# Patient Record
Sex: FEMALE | Race: WHITE | Employment: FULL TIME | URBAN - METROPOLITAN AREA
[De-identification: names, ages, dates, MRNs, and addresses within clinical notes are randomized per-mention and may not be internally consistent; named-entity substitution may affect disease eponyms.]

---

## 2022-11-10 ENCOUNTER — OFFICE VISIT (OUTPATIENT)
Dept: ORTHOPEDIC SURGERY | Age: 42
End: 2022-11-10

## 2022-11-10 DIAGNOSIS — S43.431A LABRAL TEAR OF SHOULDER, RIGHT, INITIAL ENCOUNTER: Primary | ICD-10-CM

## 2022-11-10 DIAGNOSIS — M25.511 RIGHT SHOULDER PAIN, UNSPECIFIED CHRONICITY: ICD-10-CM

## 2022-11-10 DIAGNOSIS — M75.21 BICEPS TENDINITIS OF RIGHT SHOULDER: ICD-10-CM

## 2022-11-10 PROCEDURE — 99204 OFFICE O/P NEW MOD 45 MIN: CPT | Performed by: PHYSICIAN ASSISTANT

## 2022-11-10 RX ORDER — TRAMADOL HYDROCHLORIDE 50 MG/1
TABLET ORAL
COMMUNITY
Start: 2022-10-20

## 2022-11-10 RX ORDER — CYCLOBENZAPRINE HCL 10 MG
TABLET ORAL
COMMUNITY
Start: 2022-10-27

## 2022-11-10 RX ORDER — NABUMETONE 500 MG/1
TABLET, FILM COATED ORAL
COMMUNITY
Start: 2022-10-27

## 2022-11-10 RX ORDER — HYDROCORTISONE ACETATE, PRAMOXINE HCL 2.5; 1 G/100G; G/100G
CREAM TOPICAL 3 TIMES DAILY PRN
COMMUNITY
Start: 2016-03-28

## 2022-11-10 RX ORDER — DIAZEPAM 5 MG/1
TABLET ORAL
COMMUNITY
Start: 2015-11-03

## 2022-11-10 RX ORDER — VALACYCLOVIR HYDROCHLORIDE 1 G/1
TABLET, FILM COATED ORAL
COMMUNITY
Start: 2022-10-07

## 2022-11-10 RX ORDER — MELOXICAM 15 MG/1
TABLET ORAL
COMMUNITY
Start: 2022-11-02

## 2022-11-10 RX ORDER — METRONIDAZOLE 500 MG/1
TABLET ORAL
COMMUNITY
Start: 2022-10-27

## 2022-11-10 RX ORDER — ERGOCALCIFEROL (VITAMIN D2) 1250 MCG
CAPSULE ORAL
COMMUNITY
Start: 2015-10-12

## 2022-11-10 NOTE — PROGRESS NOTES
Name: Chiki Wright  YOB: 1980  Gender: female  MRN: [id number]    CC:   Chief Complaint   Patient presents with    Shoulder Pain     Right shoulder pain    Right shoulder pain    HPI:  This patient presents with a 2.5-year history of Right shoulder pain. Patient is unsure the specific mechanism of injury 2.5 years ago but notes she thought she just slept on it for a while. She then notes that she does dance and her dance partner did some jerky pushing on her shoulder. She notes anterior, posterior and lateral shoulder pain. Most persistently, she states the anterior shoulder around the biceps has been the most persistent. She does note popping and catching. Notes interference with sleep, driving, lifting, and going across the body. She has tried massage, acupuncture, 3 rounds of physical therapy, dry needling and cupping without any relief. No Known Allergies  History reviewed. No pertinent past medical history. History reviewed. No pertinent surgical history. History reviewed. No pertinent family history. Social History     Socioeconomic History    Marital status: Unknown     Spouse name: Not on file    Number of children: Not on file    Years of education: Not on file    Highest education level: Not on file   Occupational History    Not on file   Tobacco Use    Smoking status: Never    Smokeless tobacco: Never   Substance and Sexual Activity    Alcohol use: Not on file    Drug use: Not on file    Sexual activity: Not on file   Other Topics Concern    Not on file   Social History Narrative    Not on file     Social Determinants of Health     Financial Resource Strain: Not on file   Food Insecurity: Not on file   Transportation Needs: Not on file   Physical Activity: Not on file   Stress: Not on file   Social Connections: Not on file   Intimate Partner Violence: Not on file   Housing Stability: Not on file        No flowsheet data found.     Review of Systems  Noncontributory Pertinent positives and negatives are addressed with the patient, particularly those related to musculoskeletal concerns. Non-orthopaedic concerns were referred back to the primary care physician. PE:    GEN: General appearance is that of a healthy patient, alert and oriented, in no distress. WDWN. HEENT:  Normocephalic, atraumatic. Extraocular muscles are intact. Neck:  Supple. Heart:  Regular pulse exam on all extremities. Good color and warmth noted. Lungs:  Normal non-labored breathing with no obvious shortness of breath. Abdomen:  WNL's. Skin:  No signs of rash or bruising. Pysch: Answers questions appropriately, AO X 3. MSK:    Cervical spine: No tenderness. Normal  active motion. Negative Spurling's maneuver. SHOULDER   Right (Involved) left   Skin Intact Intact   Radial Pulses 2+ Symmetrical 2+ Symmetrical   Deformity None Normal   Myotomes Normal Normal   Dermatomes  Normal Normal    ROM Range of motion is guarded but when pushed, no true endpoint. Approximately 10 degrees decreased forward flexion comparison to the contralateral side which is hypermobile. Full   Strength Unable to assess abduction, 5/5 internal and external rotation No weakness   Atrophy None noted None noted   Effusion/Swelling  None noted None noted   Palpation Minimally TTP at the shoulder tender palpate anteriorly. No tenderness   Bicep Tendon Rupture  Negative  Negative signs   Bear Hug, Belly Press Negative, negative  Negative   Crossed Arm Adduction Test Normal     Instability/Ant. Apprehension Test None noted None noted   Impingement Positive   sylvia Mccauley, WILMER Negative      X-rays:   Grashey, axillary and outlet views of the Right shoulder that were obtained and reviewed today in the office, show a type downsloping acromion. The humeral head is not high riding  . No evidence of fracture, arthritis, dislocation or loose body. Moderate degenerative changes of the A-C joint.      Impression: Right shoulder appears normal     Assessment:     ICD-10-CM    1. Labral tear of shoulder, right, initial encounter  S43.431A MRI SHOULDER RIGHT WO CONTRAST     XR SHOULDER ARTHROGRAM RIGHT S&I      2. Right shoulder pain, unspecified chronicity  M25.511 XR SHOULDER RIGHT (MIN 2 VIEWS)     MRI SHOULDER RIGHT WO CONTRAST     XR SHOULDER ARTHROGRAM RIGHT S&I      3. Biceps tendinitis of right shoulder  M75.21 MRI SHOULDER RIGHT WO CONTRAST     XR SHOULDER ARTHROGRAM RIGHT S&I           Plan: I had a long discussion with the patient regarding the natural history of the problem, as well as treatment options. I discussed with the patient differential diagnoses including adhesive capsulitis, biceps tendinitis, labral pathology. She has been dealing with this and treated it conservatively for the last 2.5 years. I like to obtain an MRI with an arthrogram given her symptoms and biceps tenderness to assess for SLAP tear as well as other intra-articular pathology. I am going to have the patient follow-up with Dr. Monique Castro after MRI in order to review results and possible surgical discussion. We discussed the only conservative treatment she has not tried thus far as a diagnostic and therapeutic intra-articular cortisone injection. We will see the patient back after imaging discuss definitive plans based on findings. Treatment:     Given the chronicity and severity of the patient's symptoms, we will obtain an MRI. We will see them back after the scan and make a determination regarding further treatment. If there is a tear or other problem, they may require surgery. If negative, would recommend NSAID's, PT, or injection. They are amenable to this treatment plan. Return for MRI results with Dr. Monique Castro. Thank you for the opportunity to see your patient. If you have any questions please give me a call.   Sincerely,    Danish Moreno  11/10/22

## 2022-12-13 ENCOUNTER — HOSPITAL ENCOUNTER (OUTPATIENT)
Dept: MRI IMAGING | Age: 42
Discharge: HOME | End: 2022-12-16
Payer: COMMERCIAL

## 2022-12-13 ENCOUNTER — HOSPITAL ENCOUNTER (OUTPATIENT)
Dept: GENERAL RADIOLOGY | Age: 42
Discharge: HOME | End: 2022-12-16
Payer: COMMERCIAL

## 2022-12-13 VITALS
HEART RATE: 74 BPM | OXYGEN SATURATION: 100 % | RESPIRATION RATE: 18 BRPM | TEMPERATURE: 98.1 F | DIASTOLIC BLOOD PRESSURE: 77 MMHG | WEIGHT: 125 LBS | SYSTOLIC BLOOD PRESSURE: 122 MMHG | HEIGHT: 64 IN | BODY MASS INDEX: 21.34 KG/M2

## 2022-12-13 DIAGNOSIS — M25.511 RIGHT SHOULDER PAIN, UNSPECIFIED CHRONICITY: ICD-10-CM

## 2022-12-13 DIAGNOSIS — M75.21 BICEPS TENDINITIS OF RIGHT SHOULDER: ICD-10-CM

## 2022-12-13 DIAGNOSIS — S43.431A LABRAL TEAR OF SHOULDER, RIGHT, INITIAL ENCOUNTER: ICD-10-CM

## 2022-12-13 PROCEDURE — 2500000003 HC RX 250 WO HCPCS: Performed by: PHYSICIAN ASSISTANT

## 2022-12-13 PROCEDURE — 6360000004 HC RX CONTRAST MEDICATION: Performed by: PHYSICIAN ASSISTANT

## 2022-12-13 PROCEDURE — 73222 MRI JOINT UPR EXTREM W/DYE: CPT

## 2022-12-13 PROCEDURE — A9579 GAD-BASE MR CONTRAST NOS,1ML: HCPCS | Performed by: PHYSICIAN ASSISTANT

## 2022-12-13 PROCEDURE — 73040 CONTRAST X-RAY OF SHOULDER: CPT

## 2022-12-13 PROCEDURE — 2580000003 HC RX 258: Performed by: PHYSICIAN ASSISTANT

## 2022-12-13 RX ORDER — 0.9 % SODIUM CHLORIDE 0.9 %
500 INTRAVENOUS SOLUTION INTRAVENOUS ONCE
Status: COMPLETED | OUTPATIENT
Start: 2022-12-13 | End: 2022-12-13

## 2022-12-13 RX ORDER — LIDOCAINE HYDROCHLORIDE 10 MG/ML
5 INJECTION, SOLUTION INFILTRATION; PERINEURAL ONCE
Status: COMPLETED | OUTPATIENT
Start: 2022-12-13 | End: 2022-12-13

## 2022-12-13 RX ADMIN — LIDOCAINE HYDROCHLORIDE 5 ML: 10 INJECTION, SOLUTION INFILTRATION; PERINEURAL at 15:07

## 2022-12-13 RX ADMIN — GADOTERIDOL 2.5 MMOL: 279.3 INJECTION, SOLUTION INTRAVENOUS at 15:05

## 2022-12-13 RX ADMIN — IOPAMIDOL 5 ML: 612 INJECTION, SOLUTION INTRATHECAL at 15:09

## 2022-12-13 RX ADMIN — SODIUM CHLORIDE 10 ML: 900 INJECTION, SOLUTION INTRAVENOUS at 15:06

## 2022-12-21 ENCOUNTER — OFFICE VISIT (OUTPATIENT)
Dept: ORTHOPEDIC SURGERY | Age: 42
End: 2022-12-21

## 2022-12-21 DIAGNOSIS — M75.01 ADHESIVE CAPSULITIS OF RIGHT SHOULDER: ICD-10-CM

## 2022-12-21 DIAGNOSIS — S43.431A LABRAL TEAR OF SHOULDER, RIGHT, INITIAL ENCOUNTER: ICD-10-CM

## 2022-12-21 DIAGNOSIS — M75.21 BICEPS TENDINITIS OF RIGHT SHOULDER: ICD-10-CM

## 2022-12-21 DIAGNOSIS — M25.511 RIGHT SHOULDER PAIN, UNSPECIFIED CHRONICITY: Primary | ICD-10-CM

## 2022-12-21 PROBLEM — F43.22 ADJUSTMENT DISORDER WITH ANXIETY: Status: ACTIVE | Noted: 2022-12-21

## 2022-12-21 PROBLEM — N94.6 DYSMENORRHEA: Status: ACTIVE | Noted: 2022-12-21

## 2022-12-21 PROBLEM — N76.3 SUBACUTE AND CHRONIC VULVITIS: Status: ACTIVE | Noted: 2022-12-21

## 2022-12-21 PROBLEM — G47.00 INSOMNIA: Status: ACTIVE | Noted: 2022-12-21

## 2022-12-21 PROBLEM — R51.9 HEADACHE: Status: ACTIVE | Noted: 2022-12-21

## 2022-12-21 PROBLEM — B00.9 HERPES SIMPLEX VIRAL INFECTION: Status: ACTIVE | Noted: 2022-12-21

## 2022-12-21 PROBLEM — M54.2 NECK PAIN: Status: ACTIVE | Noted: 2022-12-21

## 2022-12-21 PROBLEM — F41.9 ANXIETY DISORDER: Status: ACTIVE | Noted: 2022-12-21

## 2022-12-21 PROBLEM — F32.2 SEVERE MAJOR DEPRESSION, SINGLE EPISODE, WITHOUT PSYCHOTIC FEATURES (HCC): Status: ACTIVE | Noted: 2022-12-21

## 2022-12-21 PROBLEM — R63.0 ANOREXIA: Status: ACTIVE | Noted: 2022-12-21

## 2022-12-21 PROBLEM — J30.9 ALLERGIC RHINITIS: Status: ACTIVE | Noted: 2022-12-21

## 2022-12-21 RX ORDER — TRIAMCINOLONE ACETONIDE 40 MG/ML
80 INJECTION, SUSPENSION INTRA-ARTICULAR; INTRAMUSCULAR ONCE
Status: COMPLETED | OUTPATIENT
Start: 2022-12-21 | End: 2022-12-21

## 2022-12-21 RX ADMIN — TRIAMCINOLONE ACETONIDE 80 MG: 40 INJECTION, SUSPENSION INTRA-ARTICULAR; INTRAMUSCULAR at 10:50

## 2022-12-21 NOTE — PROGRESS NOTES
Name: Kenya Mcdonald  YOB: 1980  Gender: female  MRN: [id number]    CC:   Chief Complaint   Patient presents with    Follow-up     MRI results right shoulder    Right shoulder pain    HPI:  This patient presents with a chronic history of Right shoulder pain. Patient has previously seen my PA. Recall from their visit:    This patient presents with a 2.5-year history of Right shoulder pain. Patient is unsure the specific mechanism of injury 2.5 years ago but notes she thought she just slept on it for a while. She then notes that she does dance and her dance partner did some jerky pushing on her shoulder. She notes anterior, posterior and lateral shoulder pain. Most persistently, she states the anterior shoulder around the biceps has been the most persistent. She does note popping and catching. Notes interference with sleep, driving, lifting, and going across the body. She has tried massage, acupuncture, 3 rounds of physical therapy, dry needling and cupping without any relief. Allergies   Allergen Reactions    Nickel     Topical Sulfur Hives     History reviewed. No pertinent past medical history. History reviewed. No pertinent surgical history. History reviewed. No pertinent family history.   Social History     Socioeconomic History    Marital status: Unknown     Spouse name: Not on file    Number of children: Not on file    Years of education: Not on file    Highest education level: Not on file   Occupational History    Not on file   Tobacco Use    Smoking status: Never    Smokeless tobacco: Never   Substance and Sexual Activity    Alcohol use: Not on file    Drug use: Not on file    Sexual activity: Not on file   Other Topics Concern    Not on file   Social History Narrative    Not on file     Social Determinants of Health     Financial Resource Strain: Not on file   Food Insecurity: Not on file   Transportation Needs: Not on file   Physical Activity: Not on file   Stress: Not on file Social Connections: Not on file   Intimate Partner Violence: Not on file   Housing Stability: Not on file        No flowsheet data found. Review of Systems  Noncontributory   Pertinent positives and negatives are addressed with the patient, particularly those related to musculoskeletal concerns. Non-orthopaedic concerns were referred back to the primary care physician. PE:    GEN: General appearance is that of a healthy patient, alert and oriented, in no distress. WDWN. HEENT:  Normocephalic, atraumatic. Extraocular muscles are intact. Neck:  Supple. Heart:  Regular pulse exam on all extremities. Good color and warmth noted. Lungs:  Normal non-labored breathing with no obvious shortness of breath. Abdomen:  WNL's. Skin:  No signs of rash or bruising. Pysch: Answers questions appropriately, AO X 3. MSK:    Cervical spine: No tenderness. Normal  active motion. Negative Spurling's maneuver. SHOULDER   Right (Involved) left   Skin Intact Intact   Radial Pulses 2+ Symmetrical 2+ Symmetrical   Deformity None Normal   Myotomes Normal Normal   Dermatomes  Normal Normal    ROM External rotation is neutral, internal rotation around L4. Flexion 110, abduction 90 Full   Strength Strength seems fairly normal throughout today No weakness   Atrophy None noted None noted   Effusion/Swelling  None noted None noted   Palpation TTP at the shoulder anteriorly. No tenderness   Bicep Tendon Rupture  Negative  Negative signs   Bear Hug, Belly Press Negative, negative  Negative   Crossed Arm Adduction Test Unable to test     Instability/Ant. Apprehension Test None noted None noted   Impingement Positive   sylvia Mccauley AOM Negative         MRI right shoulder with arthrogram from 12/13/22:    Narrative   MRI arthrogram of the right shoulder. CLINICAL INDICATION: Severe right shoulder pain with limited range of motion   motion; no specific injury.        PROCEDURE multiplanar multisequence MR imaging performed through the right   shoulder following the intra-articular injection of a dilute gadolinium contrast   mixture. Note, the initial injection was a superficial.       COMPARISON: No prior. FINDINGS:       AC joint: The AC joint is intact and unremarkable. There is trace fluid signal   in the subacromial/subdeltoid bursa. This is most likely related the initial   superficial injection. Rotator cuff: The supraspinatus, infraspinatus, teres minor, and subscapularis   tendons are intact. Contrast is noted anterior to the subscapularis muscle that   tracks under the anterior and middle bellies of the deltoid from the initial   superficial injection. Biceps labral complex: The long head of biceps tendon is intact. The superior   labrum is intact. The joint capsule in the axillary recess is intact. Glenohumeral joint: There is a small linear cleft of contrast between the   posterior labrum and underlying glenoid cartilage at the 3:00 to 4:00 position   on the glenoid on axial series 10, image number 12 and 13. This is either a   normal anatomic cleft related to the contour of the articular cartilage or   possibly sequela of a prior more chronic labral separation injury that has   scarred down. There is no tearing of the labral tissue itself or displacement of   the labral tissue. No focal chondral defect evident. The anterior labrum is   intact. The marrow signal in the humeral head and glenoid is normal.       No abnormal soft tissue mass or fatty atrophy. Impression   1. Small cleft of contrast along the posterior glenoid at the 3:00 to 4:00   position that may be a normal anatomic cleft for possibly sequela of prior   chondral labral separation injury with subsequent scarring. There is no discrete   tearing of the labral tissue itself or displacement of the labral tissue. 2. The rotator cuff tendon is intact.    3. The contrast within the anterior soft tissues tracking underneath the   anterior and middle bellies of the deltoid is related to initial superficial   injection of contrast.         Independent review of the MRIs performed. Rotator cuff appeared intact. Biceps tendon is intact but the visual ability of it is decreased superiorly. Subscap appears intact. The anterior labrum appears in appropriate condition. Agree there is a small area of increased contrast on axillary view series 10 image 12 through 13    Assessment:     ICD-10-CM    1. Right shoulder pain, unspecified chronicity  M25.511       2. Labral tear of shoulder, right, initial encounter  S43.431A       3. Biceps tendinitis of right shoulder  M75.21       4. Adhesive capsulitis of right shoulder  M75.01            Plan: I had a long discussion with the patient regarding the natural history of the problem, as well as treatment options. Discussed I think she has a frozen shoulder now. She could be early in the process as she still has decent flexion. Recommend possibility of steroid injection which she defers. Recommend formal therapy to address frozen shoulder only. We will check back in 6 weeks and see where her motion is. Treatment:     Recommend therapy to evaluate and treat current complaints and pathology. A home exercise program was also discussed with the patient. Return in about 6 weeks (around 2/1/2023). Thank you for the opportunity to see your patient. If you have any questions please give me a call.   Sincerely,    Paul Branch MD  12/21/22

## 2022-12-21 NOTE — PATIENT INSTRUCTIONS
Frozen Shoulder  Frozen shoulder, also called adhesive capsulitis, causes pain and stiffness in the shoulder. Over time, the shoulder becomes very hard to move. After a period of worsening symptoms, frozen shoulder tends to get better, although full recovery may take up to 3 years. Physical therapy, with a focus on shoulder flexibility, is the primary treatment recommendation for frozen shoulder. Frozen shoulder most commonly affects people between the ages of 36 and 61, and occurs in women more often than men. In addition, people with diabetes are at an increased risk for developing frozen shoulder. Anatomy  Your shoulder is a ball-and-socket joint made up of three bones: your upper arm bone (humerus), your shoulder blade (scapula), and your collarbone (clavicle). The head of the upper arm bone fits into a shallow socket in your shoulder blade. Strong connective tissue, called the shoulder capsule, surrounds the joint. To help your shoulder move more easily, synovial fluid lubricates the shoulder capsule and the joint. Description  In frozen shoulder, the shoulder capsule thickens and becomes stiff and tight. Thick bands of tissue -- called adhesions -- develop. In many cases, there is less synovial fluid in the joint. The hallmark signs of this condition are severe pain and being unable to move your shoulder -- either on your own or with the help of someone else. It develops in three stages:    Stage 1: Freezing  In the \"freezing\" stage, you slowly have more and more pain. As the pain worsens, your shoulder loses range of motion. Freezing typically lasts from 6 weeks to 9 months. Stage 2: Frozen  Painful symptoms may actually improve during this stage, but the stiffness remains. During the 4 to 6 months of the \"frozen\" stage, daily activities may be very difficult. Stage 3: Thawing  Shoulder motion slowly improves during the \"thawing\" stage.  Complete return to normal or close to normal strength and motion typically takes from 6 months to 2 years. Cause  The causes of frozen shoulder are not fully understood. There is no clear connection to arm dominance or occupation. A few factors may put you more at risk for developing frozen shoulder. Diabetes. Frozen shoulder occurs much more often in people with diabetes. The reason for this is not known. In addition, diabetic patients with frozen shoulder tend to have a greater degree of stiffness that continues for a longer time before \"thawing. \"    Other diseases. Some additional medical problems associated with frozen shoulder include hypothyroidism, hyperthyroidism, Parkinson's disease, and cardiac disease. Immobilization. Frozen shoulder can develop after a shoulder has been immobilized for a period of time due to surgery, a fracture, or other injury. Having patients move their shoulders soon after injury or surgery is one measure prescribed to prevent frozen shoulder. Symptoms  Pain from frozen shoulder is usually dull or aching. It is typically worse early in the course of the disease and when you move your arm. The pain is usually located over the outer shoulder area and sometimes the upper arm. Doctor Examination    Physical Examination  After discussing your symptoms and medical history, your doctor will examine your shoulder. Your doctor will move your shoulder carefully in all directions to see if movement is limited and if pain occurs with the motion. The range of motion when someone else moves your shoulder is called \"passive range of motion. \" Your doctor will compare this to the range of motion you display when you move your shoulder on your own (\"active range of motion\"). People with frozen shoulder have limited range of motion both actively and passively. Imaging Tests  Other tests that may help your doctor rule out other causes of stiffness and pain include:    X-rays.  Dense structures, such as bone, show up clearly on x-rays. X-rays may show other problems in your shoulder, such as arthritis. Magnetic resonance imaging (MRI) and ultrasound. These studies can create better images of soft tissues. They are not required to diagnose frozen shoulder, however, they may help to identify other problems in your shoulder, such as a torn rotator cuff. Treatment  Frozen shoulder generally gets better over time, although it may take up to 3 years. The focus of treatment is to control pain and restore motion and strength through physical therapy. Nonsurgical Treatment  Most people with frozen shoulder improve with relatively simple treatments to control pain and restore motion. Non-steroidal anti-inflammatory medicines. Drugs like aspirin and ibuprofen reduce pain and swelling. Steroid injections. Cortisone is a powerful anti-inflammatory medicine that is injected directly into your shoulder joint. Hydrodilatation. If your symptoms are not relieved by other nonsurgical methods, your doctor may recommend hydrodilatation. This procedure involves gently injecting a large volume of sterile fluid into the shoulder joint to expand and stretch the shoulder joint capsule. Hydrodilatation is conducted by a radiologist who uses imaging to guide the placement of fluid. Physical therapy. Specific exercises will help restore motion. These may be done under the supervision of a physical therapist or via a home program. Therapy includes stretching or range of motion exercises for the shoulder. Sometimes heat is used to help loosen the shoulder up before stretching. Below are examples of some of the exercises that might be recommended. External rotation -- passive stretch.  a doorway and bend your affected arm's elbow to 90 degrees to reach the doorjamb. Keep your hand in place and rotate your body as shown in the illustration. Hold for 30 seconds. Relax and repeat. Forward flexion -- supine position.  Lie on your back with your legs straight. Use your unaffected arm to lift your affected arm overhead until you feel a gentle stretch. Hold for 15 seconds and slowly lower to start position. Relax and repeat. Crossover arm stretch. Gently pull one arm across your chest just below your chin as far as possible without causing pain. Hold for 30 seconds. Relax and repeat. Surgical Treatment  If your symptoms are not relieved by therapy and other conservative methods, you and your doctor may discuss surgery. It is important to talk with your doctor about your potential for recovery continuing with simple treatments, and the risks involved with surgery. Surgery for frozen shoulder is typically offered during \"Stage 2: Frozen. \" The goal of surgery is to stretch and release the stiffened joint capsule. The most common methods include manipulation under anesthesia and shoulder arthroscopy. Manipulation under anesthesia. During this procedure, you are put to sleep. Your doctor will force your shoulder to move which causes the capsule and scar tissue to stretch or tear. This releases the tightening and increases range of motion. Shoulder arthroscopy. In this procedure, your doctor will cut through tight portions of the joint capsule. This is done using pencilsized instruments inserted through small incisions around your shoulder. In many cases, manipulation and arthroscopy are used in combination to obtain maximum results. Most patients have good outcomes with these procedures. Recovery. After surgery, physical therapy is necessary to maintain the motion that was achieved with surgery. Recovery times vary, from 6 weeks to 3 months. Although it is a slow process, your commitment to therapy is the most important factor in returning to all the activities you enjoy. Long-term outcomes after surgery are generally good, with most patients having reduced or no pain and improved range of motion.  In some cases, however, even after several years, the motion does not return completely and some degree of stiffness remains. Diabetic patients often have some degree of continued shoulder stiffness after surgery. Although uncommon, frozen shoulder can recur, especially if a contributing factor like diabetes is still present. Patient Education        Frozen Shoulder: Exercises  Introduction  Here are some examples of exercises for you to try. The exercises may be suggested for a condition or for rehabilitation. Start each exercise slowly. Ease off the exercises if you start to have pain. You will be told when to start these exercises and which ones will work best for you. How to do the exercises  Neck stretches  Look straight ahead, and tip your right ear to your right shoulder. Do not let your left shoulder rise up as you tip your head to the right. Hold 15 to 30 seconds. Tilt your head to the left. Do not let your right shoulder rise up as you tip your head to the left. Hold for 15 to 30 seconds. Repeat 2 to 4 times to each side. Shoulder rolls  Sit comfortably with your feet shoulder-width apart. You can also do this exercise while standing. Roll your shoulders up, then back, and then down in a smooth, circular motion. Repeat 2 to 4 times. Shoulder flexion (lying down)  For this exercise, you will need a wand. To make a wand, use a piece of PVC pipe or a broom handle with the broom removed. Make the wand about a foot wider than your shoulders. Lie on your back, holding a wand with your hands. Your palms should face down as you hold the wand. Place your hands slightly wider than your shoulders. Keeping your elbows straight, slowly raise your arms over your head until you feel a stretch in your shoulders, upper back, and chest.  Hold 15 to 30 seconds. Repeat 2 to 4 times. Shoulder rotation (lying down)  To make a wand, use a piece of PVC pipe or a broom handle with the broom removed.  Make the wand about a foot wider than your shoulders. Lie on your back and hold a wand in both hands with your elbows bent and your palms up. Keeping your elbows close to your body, move the wand across your body toward the arm that has pain. Hold for 15 to 30 seconds. Repeat 2 to 4 times. Shoulder internal rotation with towel  Roll up a towel lengthwise. Hold the towel above and behind your head with the arm that is not sore. With your sore arm, reach behind your back and grasp the towel. Using the arm above your head, pull the towel upward until you feel a stretch on the front and outside of your sore shoulder. Hold 15 to 30 seconds. Relax and move the towel back down to the starting position. Repeat 2 to 4 times. Shoulder blade squeeze  While standing with your arms at your sides, squeeze your shoulder blades together. Do not raise your shoulders up as you are squeezing. Hold for 6 seconds. Repeat 8 to 12 times. Follow-up care is a key part of your treatment and safety. Be sure to make and go to all appointments, and call your doctor if you are having problems. It's also a good idea to know your test results and keep a list of the medicines you take. Current as of: March 9, 2022               Content Version: 13.5  © 2006-2022 [employer]. Care instructions adapted under license by [facility] ([facility]). If you have questions about a medical condition or this instruction, always ask your healthcare professional. [place] [address] any warranty or liability for your use of this information.

## 2023-08-09 ENCOUNTER — TRANSCRIBE ORDERS (OUTPATIENT)
Dept: SCHEDULING | Age: 43
End: 2023-08-09

## 2023-08-09 DIAGNOSIS — Z12.31 SCREENING MAMMOGRAM FOR HIGH-RISK PATIENT: Primary | ICD-10-CM

## 2023-08-18 ENCOUNTER — HOSPITAL ENCOUNTER (OUTPATIENT)
Dept: MAMMOGRAPHY | Age: 43
Discharge: HOME | End: 2023-08-18
Payer: COMMERCIAL

## 2023-08-18 VITALS — HEIGHT: 64 IN | WEIGHT: 125 LBS | BODY MASS INDEX: 21.34 KG/M2

## 2023-08-18 DIAGNOSIS — Z12.31 SCREENING MAMMOGRAM FOR HIGH-RISK PATIENT: ICD-10-CM

## 2023-08-18 PROCEDURE — 77067 SCR MAMMO BI INCL CAD: CPT
